# Patient Record
(demographics unavailable — no encounter records)

---

## 2019-06-02 NOTE — PDOC.EVN
Addendum - Attending





- Attending Attestation


Date/Time: 06/02/19 2034





I personally evaluated the patient and discussed the management with Liat/

Donny


I agree with the History, Examination, Assessment and Plan as documented in 

resident Hx and PE.





28 yo morbidly obese male who works outside as /

 with cellulitis  to left lower ext inner thigh. Patient had DVT ruled 

out in ER with venous doppler.


PMHX tobacco abuse, ANDREEA c/w obesity hypoventilation syndrome was previously 

admitted and seen by Pulmonary and was on BiPAP and home autotitrating CPAP 

patient with poor compliance states he just sleeps sitting up to prevent apneic 

episodes, prior admission with LLE cellulitis 





Exam significant left groin intertrigo chronis cutaneous candidiasis and left 

inner thigh with erythematous brawny indurated region appr 20 x 25 cm marked 

off and feet with mild tinea pedis.





placed on broad spectrum IV coverage will need resume BiPAP or home CPAP and 

oral antifungal therapy.


.

## 2019-06-02 NOTE — ULT
EXAM: Bilateral lower extremity venous ultrasound



HISTORY: Bilateral lower extremity pain and edema; shortness of breath



COMPARISON: None



TECHNIQUE: Multiplanar grayscale and color Doppler images were obtained in a bilateral lower extremit
y venous ultrasound. Spectral analysis of the Doppler waveforms were performed.



FINDINGS: This exam is limited secondary to the patient's body habitus. The bilateral common femoral 
vein, profunda femoral veins, superficial femoral veins, and popliteal veins are normal in

appearance without visible thrombus. These vessels demonstrate normal compression, flow, and augmenta
tion.



The bilateral posterior tibial veins and greater saphenous veins are patent without evidence of DVT.



IMPRESSION:

No evidence of DVT.



Reported By: Mark Jean-Baptiste 

Electronically Signed:  6/2/2019 5:40 PM

## 2019-06-02 NOTE — RAD
1 view chest:



CLINICAL HISTORY: Short of breath



COMPARISON: 8/31/2017



FINDINGS:



There is no focal consolidation, effusion, or pneumothorax. 

Cardiac silhouette is markedly enlarged.

No acute osseous abnormality.



IMPRESSION:



No focal consolidation.

Persistent marked enlargement of cardiac silhouette.



Reported By: Adam Lobato 

Electronically Signed:  6/2/2019 3:57 PM

## 2019-06-03 NOTE — PDOC.FM
- Subjective


Subjective: 





Still with tenderness to left medial thigh. No fevers, some appetite. Feels 

about the same. 





- Objective


Vital Signs & Weight: 


 Vital Signs (12 hours)











  Temp Pulse Resp BP BP Pulse Ox


 


 06/03/19 04:29  97.2 F L  100  24 H  168/100 H   93 L


 


 06/03/19 00:22  97.9 F  100  20  103/65   92 L


 


 06/02/19 20:00  99.0 F  97  21 H   138/89  92 L


 


 06/02/19 18:15  99 F  106 H  30 H   136/86  91 L








 Weight











Weight                         215.502 kg














Result Diagrams: 


 06/03/19 05:08





 06/03/19 05:08





Phys Exam





- Physical Examination


Constitutional: NAD


morbidly obese


HEENT: PERRLA, moist MMs


Respiratory: no wheezing, clear to auscultation bilateral


no respiratory distress


Cardiovascular: RRR, no significant murmur


Neurological: non-focal, moves all 4 limbs


Deviation from normal: left inner thigh with erythema, no fluctance, TTP


-: tinea pedis on both feet bilaterally





Dx/Plan


(1) Sleep apnea


Code(s): G47.30 - SLEEP APNEA, UNSPECIFIED   Status: Chronic   





(2) Cellulitis of left thigh


Code(s): L03.116 - CELLULITIS OF LEFT LOWER LIMB   Status: Acute   





(3) Chronic respiratory failure with hypercapnia


Code(s): J96.12 - CHRONIC RESPIRATORY FAILURE WITH HYPERCAPNIA   Status: Acute 

  





(4) Obesity hypoventilation syndrome


Code(s): E66.2 - MORBID (SEVERE) OBESITY WITH ALVEOLAR HYPOVENTILATION   Status

: Acute   





(5) Sepsis


Code(s): A41.9 - SEPSIS, UNSPECIFIED ORGANISM   Status: Acute   





(6) Tinea pedis


Code(s): B35.3 - TINEA PEDIS   Status: Acute   





- Plan


Plan: 





#sepsis 2/2 left medial thigh cellulitis 


-sepsis by leukocytosis, temp to 102.4 in ER and associated tachycardia and 

tachypnea


-fever resolved, pulse normal to lower limit of tachycardia range


-continue vanc and zosyn, pending blood cultures


-tylenol for fever


-no evidence of underlying abscess at this time, patient currently responsive 

to abx therapy. however will continue to monitor and maintain low threshold for 

US if needed





#Intertrigo


- extensive- will use fluconazole 150 mg once weekly for 4 weeks


- baseline creatinine normal, will monitor creatinine  fluconazole


-zinc oxide barrier cream applied to groin interiginous regions


-wound care consult for further recs on barrier treatments/drying agents 





#Tinea pedis


-see plan for intertrigo





#Morbid obesity


-A1c normal, TSH normal


-will encourage lifestyle mofidication





#ANDREEA


-continue home CPAP, encouraged to use





#Suspected obesity hypoventilation syndrome


-recommend use of home CPAP


-O2 PRN





dvt ppx: Lovenox


PCP: Rod


Dispo: >2 midnights. Continue broad spectrum antibiotics until blood cultures 

result. If unimproved consider U/S to rule out abscess.

## 2019-06-03 NOTE — ULT
Ultrasound soft tissues of the left thigh



INDICATION: Concern for abscess



Comparison prior ultrasound of the left thigh dated August 19, 2018



FINDINGS: Submitted grayscale and color Doppler images demonstrate some reticulation of the subcutane
ous fat consistent with soft tissue edema. No definite drainable fluid collection is seen involving

the subcutaneous fat or underlying musculature of the left medial thigh and left posterior thigh.



IMPRESSION: Soft tissue edema without drainable fluid collection evident by ultrasound.



Reported By: Tanner Caldera 

Electronically Signed:  6/3/2019 10:45 AM

## 2019-06-03 NOTE — EKG
Test Reason : STAT

Blood Pressure : ***/*** mmHG

Vent. Rate : 100 BPM     Atrial Rate : 100 BPM

   P-R Int : 132 ms          QRS Dur : 102 ms

    QT Int : 346 ms       P-R-T Axes : 063 029 092 degrees

   QTc Int : 446 ms

 

Normal sinus rhythm

Abnormal QRS-T angle, consider primary T wave abnormality

Abnormal ECG

When compared with ECG of 18-AUG-2018 18:47,

No significant change was found

Confirmed by LACEY GARZA, . SAriana (4) on 6/3/2019 5:22:41 PM

 

Referred By:  MICHELLE           Confirmed By:DR. GUILLE RAHMAN MD

## 2019-06-04 NOTE — PDOC.FM
- Subjective


Subjective: 





NAEO. Denies fevers, headaches.  in area of infection. 





- Objective


MAR Reviewed: Yes


Vital Signs & Weight: 


 Vital Signs (12 hours)











  Temp Pulse Resp BP Pulse Ox


 


 06/04/19 03:40  97.4 F L  99  20  141/81 H  92 L


 


 06/03/19 20:00  98.5 F  82  20  143/91 H  93 L








 Weight











Admit Weight                   215.502 kg


 


Weight                         215.502 kg














I&O: 


 











 06/03/19 06/04/19 06/05/19





 06:59 06:59 06:59


 


Intake Total  1570 


 


Output Total  1100 


 


Balance  470 











Result Diagrams: 


 06/04/19 05:50





 06/04/19 05:50





Phys Exam





- Physical Examination


Constitutional: NAD


morbidly obese


HEENT: PERRLA, moist MMs


Respiratory: no wheezing


no resp distress


Cardiovascular: RRR, no significant murmur


Neurological: moves all 4 limbs


Deviation from normal: eryythema not regressed from original margins, TTP, no 

fluctuance





Dx/Plan


(1) Sleep apnea


Code(s): G47.30 - SLEEP APNEA, UNSPECIFIED   Status: Chronic   





(2) Cellulitis of left thigh


Code(s): L03.116 - CELLULITIS OF LEFT LOWER LIMB   Status: Acute   





(3) Chronic respiratory failure with hypercapnia


Code(s): J96.12 - CHRONIC RESPIRATORY FAILURE WITH HYPERCAPNIA   Status: Acute 

  





(4) Obesity hypoventilation syndrome


Code(s): E66.2 - MORBID (SEVERE) OBESITY WITH ALVEOLAR HYPOVENTILATION   Status

: Acute   





(5) Sepsis


Code(s): A41.9 - SEPSIS, UNSPECIFIED ORGANISM   Status: Acute   





(6) Tinea pedis


Code(s): B35.3 - TINEA PEDIS   Status: Acute   





- Plan


Plan: 





#sepsis 2/2 left medial thigh cellulitis 


-sepsis by leukocytosis, temp to 102.4 in ER and associated tachycardia and 

tachypnea


-sepsis has resolved


-on vanc and zosyn, pending blood cultures, NGTD so far


-no evidence of underlying abscess at this time per US. 


-clinically minimally improved but procal trending down, white count resolved, 

faebrile so will continue vanc, will discuss de-escalating zosyn





#Elevated BPs


-Likely from ANDREEA and morbid obesity


-Ranges from normal to HTN 2


-Pt not been wearing CPAP, placed order to wear tonight


-If pressures remain HTN range, will start amlodipine





#Intertrigo


- extensive- will use fluconazole 150 mg once weekly for 4 weeks


- baseline creatinine normal, will monitor creatinine  fluconazole


-zinc oxide barrier cream applied to groin interiginous regions


-wound care consult for further recs on barrier treatments/drying agents 





#Tinea pedis


-see plan for intertrigo





#Morbid obesity


-A1c normal, TSH normal


-will encourage lifestyle mofidication





#ANDREEA


-continue home CPAP, encouraged to use





#Suspected obesity hypoventilation syndrome


-recommend use of home CPAP


-O2 PRN





dvt ppx: Lovenox 


PCP: Rod


Dispo: >2 midnights. Continue broad spectrum antibiotics until blood cultures 

result and clinically improved. 





Addendum - Attending





- Attending Attestation


Date/Time: 06/04/19 1017





I personally evaluated the patient and discussed the management with Dr. Sheriff.


I agree with the History, Examination, Assessment and Plan documented above 

with any addition or exceptions noted below.


28 yo M with morbid obesity and clinical diagnosis of ANDREEA here with LLE 

cellulitis. Minimal improvement in erythema and pain. BCx negative to date. 

Recommend ongoing IV treatment. Sono was negative for underling abscess. Will 

monitor for at least additional day with IV abx before considering d/c. Will 

also start therapy for HTN. Recommend compliance with CPAP.

## 2019-06-05 NOTE — PDOC.FM
- Subjective


Subjective: 





NAEO. Denies fevers.  in left leg at site of cellulitis. Mildly 

improved. 





- Objective


Vital Signs & Weight: 


 Vital Signs (12 hours)











  Temp Pulse Resp BP Pulse Ox


 


 06/05/19 08:30  97.7 F  76  20  143/84 H  95


 


 06/05/19 04:20  95.8 F L  67  22 H  161/94 H  98


 


 06/05/19 00:15  98.1 F  89   121/70  94 L


 


 06/05/19 00:00      97








 Weight











Admit Weight                   215.502 kg


 


Weight                         219.448 kg














I&O: 


 











 06/04/19 06/05/19 06/06/19





 06:59 06:59 06:59


 


Intake Total 1570 2890 


 


Output Total 1100 800 


 


Balance 470 2090 











Result Diagrams: 


 06/05/19 04:57





 06/05/19 04:57





Phys Exam





- Physical Examination


Constitutional: NAD


morbidly obese


HEENT: PERRLA, moist MMs


Respiratory: clear to auscultation bilateral


no respiratory distress


Cardiovascular: RRR, no significant murmur


Neurological: non-focal, moves all 4 limbs


Psychiatric: normal affect, A&O x 3


Deviation from normal: erythema mildly improved and regressed from margins, TTP

, no fluctuance


-: intertrigo in inguinal folds, tinea pedis





Dx/Plan


(1) Sleep apnea


Code(s): G47.30 - SLEEP APNEA, UNSPECIFIED   Status: Chronic   





(2) Cellulitis of left thigh


Code(s): L03.116 - CELLULITIS OF LEFT LOWER LIMB   Status: Acute   





(3) Chronic respiratory failure with hypercapnia


Code(s): J96.12 - CHRONIC RESPIRATORY FAILURE WITH HYPERCAPNIA   Status: Acute 

  





(4) Obesity hypoventilation syndrome


Code(s): E66.2 - MORBID (SEVERE) OBESITY WITH ALVEOLAR HYPOVENTILATION   Status

: Acute   





(5) Sepsis


Code(s): A41.9 - SEPSIS, UNSPECIFIED ORGANISM   Status: Acute   





(6) Tinea pedis


Code(s): B35.3 - TINEA PEDIS   Status: Acute   





- Plan


Plan: 








#sepsis 2/2 left medial thigh cellulitis 


-sepsis by leukocytosis, temp to 102.4 in ER and associated tachycardia and 

tachypnea


-sepsis has resolved, VSS since beenon floors


-on vanc and doxycycline for MRSA/staph/strep and vibrio coverage- pending 

blood cultures, NGTD 


-no evidence of underlying abscess at this time per U/S


-WBC normal, procal trending down, continue course





#Elevated BPs


-Likely from ANDREEA and morbid obesity


-Ranges from normal to HTN stage II


-Continue CPAP


-Continue HCTZ, will discuss dosage increase 





#Intertrigo


- extensive- will use fluconazole 150 mg once weekly for 4 weeks


- baseline creatinine normal, will monitor creatinine  fluconazole


-zinc oxide barrier cream applied to groin interiginous regions


-wound care consult for further recs on barrier treatments/drying agents 





#Tinea pedis


-see plan for intertrigo





#Morbid obesity


-A1c normal, TSH normal


-will encourage lifestyle mofidication





#ANDREEA


-continue home CPAP, encouraged to use





#Suspected obesity hypoventilation syndrome


-recommend use of home CPAP


-O2 PRN





dvt ppx: Lovenox 


PCP: Rod


Dispo: >2 midnights. Continue vanc and doxy until clinically improved.

## 2019-06-06 NOTE — PDOC.FM
- Subjective


Subjective: 





NAEO. Much improved on cellultis, able to walk. Ready to go home. 





- Objective


MAR Reviewed: Yes


Vital Signs & Weight: 


 Vital Signs (12 hours)











  Temp Pulse Resp BP Pulse Ox


 


 06/06/19 03:41  98.9 F  84  19  153/72 H  95


 


 06/06/19 00:00    20  


 


 06/05/19 20:00  99.6 F  82  20  154/90 H  97








 Weight











Admit Weight                   215.502 kg


 


Weight                         219.448 kg














I&O: 


 











 06/04/19 06/05/19 06/06/19





 06:59 06:59 06:59


 


Intake Total 1570 2890 800


 


Output Total 1100 800 500


 


Balance 470 2090 300











Result Diagrams: 


 06/06/19 04:52





 06/06/19 04:52





Phys Exam





- Physical Examination


Constitutional: NAD


morbidly obese 


HEENT: PERRLA, moist MMs


Respiratory: no wheezing, clear to auscultation bilateral


Cardiovascular: RRR, no significant murmur


Neurological: non-focal, moves all 4 limbs


Deviation from normal: erythema improved, tender but improved, no fluctuance or 

necrosis


-: intertrigo





Dx/Plan


(1) Sleep apnea


Code(s): G47.30 - SLEEP APNEA, UNSPECIFIED   Status: Chronic   





(2) Cellulitis of left thigh


Code(s): L03.116 - CELLULITIS OF LEFT LOWER LIMB   Status: Acute   





(3) Chronic respiratory failure with hypercapnia


Code(s): J96.12 - CHRONIC RESPIRATORY FAILURE WITH HYPERCAPNIA   Status: Acute 

  





(4) Obesity hypoventilation syndrome


Code(s): E66.2 - MORBID (SEVERE) OBESITY WITH ALVEOLAR HYPOVENTILATION   Status

: Acute   





(5) Sepsis


Code(s): A41.9 - SEPSIS, UNSPECIFIED ORGANISM   Status: Acute   





(6) Tinea pedis


Code(s): B35.3 - TINEA PEDIS   Status: Acute   





- Plan


Plan: 





#sepsis 2/2 left medial thigh cellulitis 


-sepsis by leukocytosis, temp to 102.4 in ER and associated tachycardia and 

tachypnea


-sepsis has resolved, VSS since beenon floors


-on vanc and doxycycline for MRSA/staph/strep and vibrio coverage- pending 

blood cultures, NGTD 


-no evidence of underlying abscess at this time per U/S


-WBC normal, procal trending down, continue course





#Elevated BPs


-Likely from ANDREEA and morbid obesity


-Ranges from normal to HTN stage II


-Continue CPAP


-Continue HCTZ, inc dosage





#Intertrigo


- extensive- will use fluconazole 150 mg once weekly for 4 weeks


- baseline creatinine normal, will monitor creatinine  fluconazole


-zinc oxide barrier cream applied to groin interiginous regions


-wound care consult for further recs on barrier treatments/drying agents 





#Tinea pedis


-see plan for intertrigo





#Morbid obesity


-A1c normal, TSH normal


-will encourage lifestyle mofidication





#ANDREEA


-continue home CPAP, encouraged to use





#Suspected obesity hypoventilation syndrome


-recommend use of home CPAP


-O2 PRN





dvt ppx: Lovenox 


PCP: Rod


Dispo: >2 midnights. D/c today with follow up precations. Discussed coming to 

our office, importance of wound care and prevention.

## 2019-06-07 NOTE — DIS
DATE OF ADMISSION:  06/02/2019



DATE OF DISCHARGE:  06/06/2019



ADMITTING ATTENDING:  Grady Claude Hogue, MD



RESIDENT:  Leigh Sheriff MD, PGY-1.



IMAGING AND PROCEDURES:  

1. Chest x-ray on 06/02/2019, persistent enlargement of cardiac silhouette.

2. Hemogram, no evidence of DVT.

3. Soft tissue ultrasound, soft tissue edema without drainable fluid collection





CONSULTS:  None.



PRIMARY DIAGNOSES:  

1. Sepsis secondary to left medial thigh cellulitis without evidence of abscess.

2. New diagnosis of hypertension.

3. Intertrigo.

4. Tinea pedis.

5. Morbid obesity.

6. Obstructive sleep apnea.

7. Suspected obesity hypoventilation syndrome.



DISCHARGE MEDICATIONS:  

1. Tylenol 1 g p.o. q.6 hours p.r.n. for severe pain.

2. Clindamycin 300 mg p.o. t.i.d. for 2 weeks.

3. Diflucan 150 mg p.o. once a week for 3 more weeks.

4. Zinc oxide 20% ointment one bottle topical q.1 hour p.r.n.

5. Lamisil 1% cream, 15 g cream b.i.d. for 7 days to affected areas.

6. Nystatin powder one application for 14 days.

7. Ibuprofen p.o. q.8 hours p.r.n. for pain.

8. Hydrochlorothiazide 25 mg p.o. daily.



HISTORY OF PRESENT ILLNESS/HOSPITAL COURSE:  Mr. Ascencion Field is a pleasant

29-year-old morbidly obese male who presented to the ER for leg pain, fevers,

chills.  On exam, he had presence of left upper thigh cellulitis.  He was 
started on empiric

vancomycin and Zosyn, admitted for sepsis secondary to left thigh cellulitis.  
Of

note, he was recently admitted in August 2018 for cellulitis of the left leg 
which has

completely resolved.  Ultrasound was negative for evidence of drainable abscess.

Blood cultures were negative for bacteremia. The patient rapidly improved on

vancomycin and clindamycin.  Risk factors for the patient include 

presence of tinea pedis in his feet.  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

hygiene maintenance with some skin wound opening on his feet, which could have 
been

a possible source of entrance for bacterial infection.  He was discharged to 
complete a 2-week course of

clindamycin in order to cover for MRSA, staph and strep.  In addition, Wound 
Care

saw him and provided him with instructions on care and maintenance for feet.  
He was

also instructed to keep those areas dry to prevent further skin breakdown. 



INTERTRIGO: Patient had inguinal intertrigo likely due from

morbid obesity.  He was given a one time dose of fluconazole with instructions 
to

continue 3 more dosing weekly.  The patient is sent home with nystatin powder 
and

Lamisil cream for his tinea pedis. 



HYPERTENSION:  The patient has not seen primary care doctor in a long time.  It 
is

likely he has underlying chronic hypertension.  Due to the elevated blood 
pressure,

he was started on daily hydrochlorothiazide with instructions to follow up with 
PCP

to continue further titration. 



SLEEP APNEA:  The patient has not been compliant with wearing CPAP.  He wore it

during the hospital, instructed to continue wearing this and follow up with 
PCP. 



SUSPECTED OBESITY HYPOVENTILATION SYNDROME:  Please see PCP for further workup 
of

this. 



CONDITION:  Stable.



DISCHARGE INSTRUCTIONS:  

1. Location:  Home.

2. Diet:  Heart healthy.

3. Activity:  As tolerated and continue with wound care precautions as 
discussed.

4. Followup:

a. Please follow up with PCP, Leigh Sheriff at [facility] with

information provided at the time of discharge. 

    b. Please follow up on suspected OHS in light of ANDREEA and use of CPAP.

c. Please follow up blood pressures as well too to continue titration of

antihypertensive. 







Job ID:  [id number]



MTDD

## 2019-09-22 NOTE — RAD
EXAM:

Two views chest



PROVIDED CLINICAL HISTORY:

Shortness of breath



COMPARISON:

3/24/2016



FINDINGS:

Evaluation is limited by patient body habitus. Cardiac silhouette is enlarged. Lungs appear free of s
ignificant opacity. No pleural fluid or pneumothorax apparent.



IMPRESSION:

No evidence for an acute cardiopulmonary process.



Reported By: Niraj Macedo 

Electronically Signed:  9/22/2019 3:58 PM

## 2019-09-22 NOTE — HP
CHIEF COMPLAINT:  Fever.



HISTORY OF PRESENT ILLNESS:  The patient is a 29-year-old male with morbid obesity

and history of severe sleep apnea syndrome, who presented to the emergency

department.  The patient states he was in his usual state of good health until this

morning, when he was in bed, he felt chills, and fairly profound fatigue.  He just

wanted to stay in bed and not get up.  He was a bit dizzy and his wife recognizing

that these were the symptoms he had presented with previously when he had

significant cellulitis infection in his legs, had him come to the emergency

department.  He says he is feeling a lot better now.  When he was in the emergency

department, he has received some Tylenol and 2 L of fluids.  He reports no specific

symptoms.  He did have some nausea this morning, but no vomiting.  States he did

have some diarrhea yesterday, but was fairly mild and that appears to be fully

resolved at this point.  Apparently, has a son with strep throat, but he does not

have any symptoms referable to the pharynx or respiratory tract. 



REVIEW OF SYSTEMS:  All systems reviewed, all pertinent positives and negatives

noted in the History of Present Illness.  The patient does specifically deny any

pain, redness, or swelling in his lower extremities at present. 



PAST MEDICAL HISTORY:  The patient has been admitted several times in the past with

cellulitis of his lower extremities at times, becoming quite ill and septic as a

result of it.  The patient was most recently admitted in June; at which time, he had

a white count of 35,000, had similar symptoms.  He has super-super obesity, class 3

and obstructive sleep apnea.  His wife reports that his symptoms are getting worse

that he is wearing his CPAP, is still falling asleep during conversation and falling

asleep during meals. 



SOCIAL HISTORY:  The patient has a 3 pack-year history of smoking.  He quit about 9

to 10 months ago.  Denies alcohol or drugs.  He is .  His wife's name is

Vero.  He is full code.  She would be his surrogate decision maker. 



FAMILY HISTORY:  The patient reports both parents are doing well.  He has no

significant family medical history. 



ALLERGIES:  NONE.



CURRENT MEDICATIONS:  None.



PHYSICAL EXAMINATION:

VITAL SIGNS:  Initially blood pressure 129/60, pulse 126, respirations 28,

temperature 100.8, his T-max in the emergency department is 101.2, and his O2

saturations were 90% on room air.  After 2 L of fluid, blood pressure 188/93, pulse

113, and temperature was 101.2. 

GENERAL APPEARANCE:  Morbidly obese male.  He is awake and alert, pleasant,

cooperative. 

HEENT:  PERRL.  Has no OP lesions.  He does have a visible soft palate and uvula.

Posterior pharynx is not well visualized.  No erythema is noted. 

NECK:  Supple and symmetric. 

HEART:  Regular rate and rhythm without murmurs, gallops, or rubs. 

LUNGS:  Clear to auscultation bilaterally with good chest wall expansion and air

exchange. 

ABDOMEN:  Obese, soft, nontender, and nondistended.  Positive bowel sounds.  No

masses.  No organomegaly. 

EXTREMITIES:  No cyanosis or clubbing.  There is slight chronic edema.  Has some

slight darkening discoloration consistent with some possible stasis dermatitis.

There is no erythema, warmth, or tenderness in the lower extremities. 

PSYCH:  Normal affect and behavior. 

NEUROLOGIC:  The patient has no focal deficits.  Appears to move all extremities

normally and spontaneously. 



LABORATORY DATA:  White count 36.1, hemoglobin 15.3, platelets 193, has 62

neutrophils, 15% bands, 8% lymphocytes, 11% monocytes, 2% basophils, and 2%

metamyelocytes.  Sodium 132, potassium 3.9, chloride 97, CO2 is 29, BUN 9,

creatinine is 0.81, glucose 93, lactic acid 2.4, total bilirubin 1, AST 25, and ALT

43.  Troponin less than 0.01.  BNP is 20.  Albumin 4, globulin 4.2.  Review of

previous labs indicates the patient has had some hyponatremia before with sodium

down to 133 going back as far as 2017, but he does have some normal numbers next and

as well. 



Chest x-ray is negative.



IMPRESSION AND PLAN:  

1. The patient has systemic inflammatory response syndrome, and I suspect it is

sepsis.  He has no visible or outward signs of infection that are identifiable.

However, given his history of recurrent cellulitis and his ongoing morbid obesity, I

suspect that he will have manifestations of some cellulitis by tomorrow.  However,

we need to be diligent to continue look for other potential sources of infection.

He has had blood cultures obtain.  We will get UA.  He has been given vancomycin and

Zosyn in the emergency department, which I believe were appropriate, and we will

continue with those for the time being.  The patient has a very elevated white blood

cell count, but that is very similar to what it was when he was here back in June. 

2. Mild hyponatremia.  Again, the patient has had this previously.  We will continue

to monitor after he has received significant amounts of fluids. 

3. Obstructive sleep apnea.  Continue with his home CPAP.

4. Morbid obesity.  The patient's BMI is substantially elevated, giving him a

category 3 super-super obesity. 







Job ID:  [id number]

## 2019-09-23 NOTE — PDOC.HOSPP
- Subjective


Subjective: 





Feeling much better overall.  Now has a red area on the left thigh. 





- Objective


Vital Signs & Weight: 


 Vital Signs (12 hours)











  Temp Pulse Resp BP Pulse Ox


 


 09/23/19 12:04  98.6 F  96  20  113/67  96


 


 09/23/19 08:25  98.5 F  78  16  108/68  95


 


 09/23/19 08:00      95


 


 09/23/19 04:00  98.4 F  78  24 H  161/90 H  95








 Weight











Admit Weight                   449 lb 11.888 oz


 


Weight                         449 lb 11.888 oz














I&O: 


 











 09/22/19 09/23/19 09/24/19





 06:59 06:59 06:59


 


Intake Total  1210 


 


Balance  1210 











Result Diagrams: 


 09/23/19 05:57





 09/23/19 05:57





Hospitalist ROS





- Medication


Medications: 


Active Medications











Generic Name Dose Route Start Last Admin





  Trade Name Freq  PRN Reason Stop Dose Admin


 


Acetaminophen  650 mg  09/22/19 17:14  09/22/19 22:09





  Tylenol  PO   650 mg





  Q4H PRN   Administration





  Headache/Fever/Mild Pain (1-3)   





     





     





     


 


Enoxaparin Sodium  40 mg  09/23/19 09:00  09/23/19 09:15





  Lovenox  SC   40 mg





  0900 PAULINO   Administration





     





     





     





     


 


Vancomycin HCl 2 gm/ Sodium  500 mls @ 250 mls/hr  09/23/19 01:00  09/23/19 09:

15





  Chloride  IVPB   500 mls





  0100,0900,1700 PAULINO   Administration





     





     





     





     


 


Piperacillin Sod/Tazobactam  100 mls @ 200 mls/hr  09/22/19 22:00  09/23/19 12:

57





  Sod 3.375 gm/ Sodium Chloride  IVPB   100 mls





  0400,1000,1600,2200 PAULINO   Administration





     





     





     





     














- Exam


General Appearance: NAD, awake alert


General - other findings: Morbidly obese


Heart: RRR, no murmur, no gallops, no rubs, normal peripheral pulses


Respiratory: CTAB, no wheezes, no rales, no ronchi, normal chest expansion, no 

tachypnea, normal percussion


Gastrointestinal: soft, non-tender, non-distended, normal bowel sounds, no 

palpable masses, no hepatomegaly, no splenomegaly, no bruit


Extremities - other findings: Area of erythema on medial left thigh.  Slightly 

warm, non-tender. 





Hosp A/P


(1) Chronic respiratory failure with hypercapnia


Code(s): J96.12 - CHRONIC RESPIRATORY FAILURE WITH HYPERCAPNIA   Status: Acute 

  





(2) Left leg cellulitis


Code(s): L03.116 - CELLULITIS OF LEFT LOWER LIMB   Status: Acute   





(3) Obesity hypoventilation syndrome


Code(s): E66.2 - MORBID (SEVERE) OBESITY WITH ALVEOLAR HYPOVENTILATION   Status

: Acute   





(4) Sepsis


Code(s): A41.9 - SEPSIS, UNSPECIFIED ORGANISM   Status: Acute   





(5) Morbid obesity with body mass index (BMI) greater than or equal to 70 in 

adult


Code(s): E66.01 - MORBID (SEVERE) OBESITY DUE TO EXCESS CALORIES; Z68.45 - BODY 

MASS INDEX (BMI) 70 OR GREATER, ADULT   Status: Chronic   





(6) Sleep apnea


Code(s): G47.30 - SLEEP APNEA, UNSPECIFIED   Status: Chronic   





- Plan





As anticipated, he now has cellulitic changes on the left thigh.  Were not 

present yesterday.  Not painful like it has been in the past. 


Continue with IV abx.


Consult Dr. Parry.  May be able to come off the vanc.


Needs plan for long-term suppression.


Needs to lose weight, but his not currently a candidate for bariatric surgery.

## 2019-09-24 NOTE — PDOC.HOSPP
- Subjective


Subjective: 





Feeling better.  Leg does not hurt.  He does report some pain and numbness in 

the right leg.  Has been present for at least a year.  Says he was told he had 

problems with four discs.  His wife reports she has fecal seepage, but he says 

it not much.  





- Objective


Vital Signs & Weight: 


 Vital Signs (12 hours)











  Temp Pulse Resp BP Pulse Ox


 


 09/24/19 13:23  98.3 F  83  20  131/81  96


 


 09/24/19 08:00      93 L


 


 09/24/19 07:34  98.6 F  82  20  138/79  93 L


 


 09/24/19 03:28  98.5 F  75  18  134/84  98








 Weight











Admit Weight                   449 lb 11.888 oz


 


Weight                         449 lb 11.888 oz














I&O: 


 











 09/23/19 09/24/19 09/25/19





 06:59 06:59 06:59


 


Intake Total 1210 960 


 


Balance 1210 960 











Result Diagrams: 


 09/23/19 05:57





 09/23/19 05:57





Hospitalist ROS





- Medication


Medications: 


Active Medications











Generic Name Dose Route Start Last Admin





  Trade Name Freq  PRN Reason Stop Dose Admin


 


Acetaminophen  650 mg  09/22/19 17:14  09/22/19 22:09





  Tylenol  PO   650 mg





  Q4H PRN   Administration





  Headache/Fever/Mild Pain (1-3)   





     





     





     


 


Enoxaparin Sodium  40 mg  09/23/19 09:00  09/24/19 08:16





  Lovenox  SC   40 mg





  0900 PAULINO   Administration





     





     





     





     


 


Piperacillin Sod/Tazobactam  100 mls @ 200 mls/hr  09/22/19 22:00  09/24/19 10:

48





  Sod 3.375 gm/ Sodium Chloride  IVPB   100 mls





  0400,1000,1600,2200 PAULINO   Administration





     





     





     





     


 


Vancomycin HCl 2 gm/ Sodium  500 mls @ 250 mls/hr  09/23/19 20:00  09/24/19 12:

21





  Chloride  IVPB   500 mls





  0400,1200,2000 PAULINO   Administration





     





     





     





     














- Exam


General Appearance: NAD, awake alert


General - other findings: Morbidly obese.


Heart: RRR, no murmur, no gallops, no rubs, normal peripheral pulses


Respiratory: CTAB, no wheezes, no rales, no ronchi, normal chest expansion, no 

tachypnea, normal percussion


Gastrointestinal: soft, non-tender, non-distended, normal bowel sounds, no 

palpable masses, no hepatomegaly, no splenomegaly, no bruit


Extremities - other findings: Less erythema of the left thigh.  Still a little 

warm.  Non-tender. 


Musculoskeletal: normal tone


Psychiatric: normal affect, normal behavior, A&O x 3





Hosp A/P


(1) Chronic respiratory failure with hypercapnia


Code(s): J96.12 - CHRONIC RESPIRATORY FAILURE WITH HYPERCAPNIA   Status: Acute 

  





(2) Left leg cellulitis


Code(s): L03.116 - CELLULITIS OF LEFT LOWER LIMB   Status: Acute   





(3) Obesity hypoventilation syndrome


Code(s): E66.2 - MORBID (SEVERE) OBESITY WITH ALVEOLAR HYPOVENTILATION   Status

: Acute   





(4) Sepsis


Code(s): A41.9 - SEPSIS, UNSPECIFIED ORGANISM   Status: Acute   





(5) Morbid obesity with body mass index (BMI) greater than or equal to 70 in 

adult


Code(s): E66.01 - MORBID (SEVERE) OBESITY DUE TO EXCESS CALORIES; Z68.45 - BODY 

MASS INDEX (BMI) 70 OR GREATER, ADULT   Status: Chronic   





(6) Sleep apnea


Code(s): G47.30 - SLEEP APNEA, UNSPECIFIED   Status: Chronic   





(7) Sciatica


Code(s): M54.30 - SCIATICA, UNSPECIFIED SIDE   Status: Acute   





- Plan





Cellulitis looking better.  


ID consult pending.  May be able to transition to po's and discharge as early 

as today.


Will likely need a long-term suppression plan.  


Continue with IV abx.


Needs to lose weight, but his not currently a candidate for bariatric surgery.  


He says he drinks at least 10 soft drinks per day.  He understands that he 

needs to stop that.


He is not a good surgical candidate if he does have some nerve root entrapment.

  He needs to lose weight and that may help his back situation.  It would 

certainly our ability to work it up. 


Will try some low dose neurontin at bedtime.  Cannot be aggressive because of 

the severe ANDREEA.

## 2019-09-26 NOTE — DIS
DATE OF ADMISSION:  09/22/2019



DATE OF DISCHARGE:  09/25/2019



DISCHARGE DIAGNOSES:  

1. Sepsis.

2. Cellulitis of the left lower extremity.

3. Chronic hypercapnic respiratory failure.

4. Hypoventilation of obesity syndrome.

5. Super-super obesity, grade 3, with body mass index greater than 70. 

6. Obstructive sleep apnea.

7. Right-sided sciatica.



HISTORY OF PRESENT ILLNESS:  This patient is a 29-year-old morbidly obese male who

had a history of recurrent cellulitis of the lower extremities.  The patient

presented to the emergency department basically feeling poorly and being febrile.

At that time, he had no specific evidence of infection, but he had a white count of

36,000 and was tachycardic.  With that, the patient was felt to have evidence of

sepsis and was started on broad-spectrum antibiotics including vancomycin and Zosyn. 



HOSPITAL COURSE:  The patient was admitted with those antibiotics and IV fluids.  By

the following day, as anticipated, he developed an area of cellulitis over the

distal medial left thigh covered about 8 inch area.  This was demarcated and he was

continued on the antibiotics.  The following day, the leg was actually already

starting to look a little bit better.  His white count had come down significantly

and he felt better in general.  He did have some complaints of right-sided sciatica

type pains and numbness.  He stated that he had been aware of this and had it worked

up previously and was told he had a problem with 4 disk in his back.  Obviously

given his size, he is not a good candidate for any surgical intervention nor can he

adequately get an MRI for full evaluation.  I counseled the patient at length

regarding his weight.  He admitted to drinking greater than 10 soft drinks per day

on top of normal diet.  Stated he really did not eat that much, but his soft drinks

were his downfall.  He understands he needs to discontinue those.  He was seen in

consultation by Dr. Parry who changed him to Rocephin and recommended a p.o.

regimen.  On the day of discharge, temperature was 98.4, pulse 96, respirations 18,

O2 saturation 94% on room air, BP is 155/84.  He is awake, alert, oriented,

pleasant, and cooperative. Heart is regular rate and rhythm without murmurs.  Lungs

are clear bilaterally.  Abdomen is soft, nontender, massively obese.  Extremities,

the area of erythema is almost essentially resolved at this point.  He has no other

significant edema. 



DISPOSITION:  The patient is discharged to home. 



He will be on Keflex 500 mg q.i.d. for 10 days, then he will be on Pen-Vee K 250 mg

b.i.d. for one year.  He will then need p.r.n. Keflex to use for any recurrent

symptoms that he can start on medially to try to avoid future admissions.  We will

also have gabapentin 100 mg at bedtime.  The dose is kept very deliberately low so

that he can try to make sure this does not make him too sleepy given his

hypoventilation of obesity syndrome and obstructive sleep apnea.  He is strongly

encouraged to establish with a PCP, whether that is [facility]

or a PCP of his choosing.  He is to be on a regular diet, although he understands he

should cut out the soft drinks and his activity is as tolerated.  He can return to

the hospital at any time should he feel the need to do so. 







Job ID:  [id number]

## 2019-11-29 NOTE — ULT
PLEASE READ YOUR DISCHARGE INSTRUCTIONS ENTIRELY AS IT CONTAINS IMPORTANT INFORMATION.  - Rest.    - Drink plenty of fluids.    - Tylenol or Ibuprofen as directed as needed for fever/pain.    - Follow up with your PCP or specialty clinic as directed in the next 1-2 weeks if not improved or as needed.  You can call [phone] to schedule an appointment with the appropriate provider.    - If you were prescribed antibiotics, please take them to completion.  - If you were prescribed a narcotic medication, do not drive or operate heavy equipment or machinery while taking these medications.  - If you  smoke, please stop smoking.  -You must understand that you've received an Urgent Care treatment only and that you may be released before all your medical problems are known or treated. You, the patient, will    arrange for follow up care as instructed.  - Please return to Urgent Care or to the Emergency Department if your symptoms worsen.    Patient aware and verbalized understanding.    Acute Bacterial Rhinosinusitis (ABRS)    Acute bacterial rhinosinusitis (ABRS) is an infection of your nasal cavity and sinuses. Its caused by bacteria. Acute means that youve had symptoms for less than 12 weeks.  Understanding your sinuses  The nasal cavity is the large air-filled space behind your nose. The sinuses are a group of spaces formed by the bones of your face. They connect with your nasal cavity. ABRS causes the tissue lining these spaces to become inflamed. Mucus may not drain normally. This leads to facial pain and other symptoms.  What causes ABRS?  ABRS most often follows an upper respiratory infection caused by a virus. Bacteria then infect the lining of your nasal cavity and sinuses. But you can also get ABRS if you have:  · Nasal allergies  · Long-term nasal swelling and congestion not caused by allergies  · Blockage in the nose  Symptoms of ABRS  The symptoms of ABRS may be different for each person, and can  Right lower extremity venous Doppler ultrasound

9/22/2019



COMPARISON: None



HISTORY: Pain



TECHNIQUE: Multiplanar grayscale sonographic imaging venous structures right lower extremity obtained
 with color flow and spectral analysis



FINDINGS: Right common femoral vein, greater saphenous vein, profunda femoral vein, femoral vein, pop
liteal vein, and posterior tibial vein are patent. Normal blood flow, augmentation, and compression

within the deep venous system. No evidence for DVT. The study is technically limited on the basis of 
body habitus.



IMPRESSION: No evidence for deep venous thrombosis of the right lower extremity.



Reported By: Syed Sánchez 

Electronically Signed:  9/22/2019 10:01 PM include:  · Nasal congestion  · Runny nose  · Fluid draining from the nose down the throat (postnasal drip)  · Headache  · Cough  · Pain in the sinuses  · Thick, colored fluid from the nose (mucus)  · Fever  Diagnosing ABRS  ABRS may be diagnosed if youve had an upper respiratory infection like a cold and cough for longer than 10 to 14 days. Your health care provider will ask about your symptoms and your medical history. The provider will check your vital signs, including your temperature. Youll have a physical exam. The health care provider will check your ears, nose, and throat. You likely wont need any tests. If ABRS comes back, you may have a culture or other tests.  Treatment for ABRS  Treatment may include:  · Antibiotic medicine. This is for symptoms that last for at least 10 to 14 days.  · Nasal corticosteroid medicine. Drops or spray used in the nose can lessen swelling and congestion.  · Over-the-counter pain medicine. This is to lessen sinus pain and pressure.  · Nasal decongestant medicine. Spray or drops may help to lessen congestion. Do not use them for more than a few days.  · Salt wash (saline irrigation). This can help to loosen mucus.  Possible complications of ABRS  ABRS may come back or become long-term (chronic).  In rare cases, ABRS may cause complications such as:   · Inflamed tissue around the brain and spinal cord (meningitis)  · Inflamed tissue around the eyes (orbital cellulitis)  · Inflamed bones around the sinuses (osteitis)  These problems may need to be treated in a hospital with intravenous (IV) antibiotic medicine or surgery.  When to call the health care provider  Call your health care provider if you have any of the following:  · Symptoms that dont get better, or get worse  · Symptoms that dont get better after 3 to 5 days on antibiotics  · Trouble seeing  · Swelling around your eyes  · Confusion or trouble staying awake   Date Last Reviewed: 3/3/2015  © 2721-8376 [employer]. [address] [place] [address], [place] [address]. All rights reserved. This information is not intended as a substitute for professional medical care. Always follow your healthcare professional's instructions.        Diet for Vomiting (Child)    The first step to treat vomiting and prevent dehydration is to give small amounts of fluids often.  · Start with oral rehydration solution. You can get this at drugstores and most groceries without a prescription. Give 1 to 2 teaspoons (5 ml to10 ml) every 1 to 2 minutes. Even if vomiting occurs, keep giving it as directed. Even while vomiting, your child will absorb most of the fluid.  · As your child vomits less, give larger amounts of rehydration solution at longer intervals. Do this until your child is making urine and is no longer thirsty (has no interest in drinking). Don't give your child plain water, milk, formula, or other liquids until vomiting stops.  · If frequent vomiting continues for more than 2 hours despite the above method, call your child's healthcare provider. He or she may prescribe a medicine that can make the vomiting stop.  Note: Your child may be thirsty and want to drink faster, but if vomiting, give fluids only as directed above. The idea is not to fill the stomach with each feeding. This can cause more vomiting.  The following guidelines will help you continue to care for your child:  · After 12 to 24 hours with no vomiting, resume solid foods. This includes rice cereal, other cereals, oatmeal, bread, noodles, mashed bananas, mashed potatoes, rice, applesauce, dry toast, crackers, soups with rice or noodles, and cooked vegetables. Give as much fluid as your child wants.  · After 24 hours with no vomiting, resume a normal diet.  When to call your healthcare provider  Call your child's healthcare provider right away if:  · Your child complains of severe abdominal pain  · Your child has a severe headache  · If the vomit becomes bloody or  bright yellow or green  · If you are worried your child is dehydrated  Date Last Reviewed: 1/11/2016  © 1746-1554 [employer]. [address] [place] [address], [place] [address]. All rights reserved. This information is not intended as a substitute for professional medical care. Always follow your healthcare professional's instructions.

## 2021-05-17 NOTE — CON
DATE OF CONSULTATION:  09/24/2019



REASON FOR CONSULTATION:  Cellulitis recurrence.



HISTORY OF PRESENT ILLNESS:  Mr. Field is known to me from three years ago when he

came in with a history of obesity and intertriginous maceration in the groin with a

cellulitis.  He was treated with Rocephin, transitioned to Keflex and then, he took

penicillin-VK for 12 months.  Never had a problem until now when he stopped his

penicillin suppression about a month ago and he immediately had a recurrence, he is

back in the hospital with cellulitis, he is feeling better now and he wants to go

home.  No headaches, visual symptoms, sore throat, odynophagia, or dysphagia.  No

cough or sputum production.  No chest pain.  No abdominal pain.  No diarrhea.  No

genitourinary symptoms.  He has no neurological symptoms. 



PAST MEDICAL HISTORY:  Includes obesity, cellulitis, stasis dermatitis, venous

insufficiency, and intertriginous maceration of groin. 



SOCIAL HISTORY:  Smokes intermittently.  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

irrigation/landscaping.  Does not drink or uses drugs. 



FAMILY HISTORY:  Noncontributory.



ALLERGIES:  NONE.



CURRENT MEDICATIONS:  

1. Tylenol.

2. Lovenox.

3. Zosyn.

4. Vancomycin.



PHYSICAL EXAMINATION:

VITAL SIGNS:  Temperature is normal, blood pressure 130/80, and pulse rate 84. 

SKIN:  Exam shows erythema in the left medial thigh, hard to see because of

pigmentation, but no blistering.  No areas of necrosis.  Some intertriginous

maceration of the skin, but certainly less than in 2017.  Peripheral IV access. 

HEENT:  Examination was normal. 

LUNGS:  Clear. 

HEART:  S1 and S2, regular rate. 

ABDOMEN:  Soft with a very prominent panniculus, hard to examine, but no tenderness.

 Soft.  No ascites or organomegaly.  No bladder distention. 

GENITALIA:  Exam was not abnormal. 

EXTREMITIES:  Quite massive legs with lymphedema and stasis dermatitis. 

NEUROLOGIC:  Nonfocal including cognitive function.



LABORATORY DATA:  White cell count was 36,000 down to 20,000, hemoglobin 13, and

platelets are 169 with 89% neutrophils.  Creatinine 0.81.  Albumin 3.3.  Urinalysis

0 to 3 WBCs and Microbiology with negative blood cultures thus far. 



ASSESSMENT:  Morbid obesity with stasis dermatitis.  Venous insufficiency with

cellulitis recurrence. 



DISCUSSION:  The patient was effectively suppressed for many months, but has had

recurrence immediately after discontinuing suppression.  At this time, we will

switch him to Rocephin and then probably tomorrow can go home on Keflex 500 four

times daily for about 10 days and then switch to penicillin-VK again 250 mg twice

daily.  Suppressive therapy for 12 months.  After that, I would give him a supply of

Keflex for him to have available at home to start immediately upon the earliest

signs of inflammatory change, that way, we are trying to prevent readmission. 







Job ID:  [id number]
no